# Patient Record
Sex: MALE | Race: OTHER | HISPANIC OR LATINO | URBAN - METROPOLITAN AREA
[De-identification: names, ages, dates, MRNs, and addresses within clinical notes are randomized per-mention and may not be internally consistent; named-entity substitution may affect disease eponyms.]

---

## 2024-09-14 ENCOUNTER — EMERGENCY (EMERGENCY)
Facility: HOSPITAL | Age: 24
LOS: 1 days | End: 2024-09-14
Attending: EMERGENCY MEDICINE
Payer: SELF-PAY

## 2024-09-14 VITALS
SYSTOLIC BLOOD PRESSURE: 122 MMHG | RESPIRATION RATE: 18 BRPM | DIASTOLIC BLOOD PRESSURE: 79 MMHG | TEMPERATURE: 99 F | HEART RATE: 76 BPM | WEIGHT: 153 LBS | OXYGEN SATURATION: 98 %

## 2024-09-14 VITALS
RESPIRATION RATE: 20 BRPM | TEMPERATURE: 98 F | DIASTOLIC BLOOD PRESSURE: 77 MMHG | HEART RATE: 70 BPM | OXYGEN SATURATION: 97 % | SYSTOLIC BLOOD PRESSURE: 120 MMHG

## 2024-09-14 PROCEDURE — 12011 RPR F/E/E/N/L/M 2.5 CM/<: CPT

## 2024-09-14 PROCEDURE — 73110 X-RAY EXAM OF WRIST: CPT

## 2024-09-14 PROCEDURE — T1013: CPT

## 2024-09-14 PROCEDURE — 90471 IMMUNIZATION ADMIN: CPT

## 2024-09-14 PROCEDURE — 72125 CT NECK SPINE W/O DYE: CPT | Mod: MC

## 2024-09-14 PROCEDURE — 99053 MED SERV 10PM-8AM 24 HR FAC: CPT

## 2024-09-14 PROCEDURE — 73110 X-RAY EXAM OF WRIST: CPT | Mod: 26,LT

## 2024-09-14 PROCEDURE — 70450 CT HEAD/BRAIN W/O DYE: CPT | Mod: MC

## 2024-09-14 PROCEDURE — 70450 CT HEAD/BRAIN W/O DYE: CPT | Mod: 26,MC

## 2024-09-14 PROCEDURE — 72125 CT NECK SPINE W/O DYE: CPT | Mod: 26,MC

## 2024-09-14 PROCEDURE — 90715 TDAP VACCINE 7 YRS/> IM: CPT

## 2024-09-14 PROCEDURE — 99284 EMERGENCY DEPT VISIT MOD MDM: CPT | Mod: 25

## 2024-09-14 RX ORDER — TETANUS TOXOID, REDUCED DIPHTHERIA TOXOID AND ACELLULAR PERTUSSIS VACCINE, ADSORBED 5; 2.5; 8; 8; 2.5 [IU]/.5ML; [IU]/.5ML; UG/.5ML; UG/.5ML; UG/.5ML
0.5 SUSPENSION INTRAMUSCULAR ONCE
Refills: 0 | Status: COMPLETED | OUTPATIENT
Start: 2024-09-14 | End: 2024-09-14

## 2024-09-14 RX ADMIN — TETANUS TOXOID, REDUCED DIPHTHERIA TOXOID AND ACELLULAR PERTUSSIS VACCINE, ADSORBED 0.5 MILLILITER(S): 5; 2.5; 8; 8; 2.5 SUSPENSION INTRAMUSCULAR at 06:38

## 2024-09-14 NOTE — ED PROVIDER NOTE - NSFOLLOWUPINSTRUCTIONS_ED_ALL_ED_FT
- Please bring all documentation from your ED visit to any related future follow up appointment.  - Please call to schedule follow up appointment with your primary care physician within 24-48 hours.  - Please seek immediate medical attention or return to the ED for any new/worsening, signs/symptoms, or concerns - Please bring all documentation from your ED visit to any related future follow up appointment.  - Please call to schedule follow up appointment with your primary care physician within 24-48 hours.  - Please seek immediate medical attention or return to the ED for any new/worsening, signs/symptoms, or concerns    return to ED in 1 week for suture removal  regrese en kaden semana para retirar la sutura: lave la herida diariamente con agua y jabón, regrese a la celestino de emergencias si presenta algún signo de infección.

## 2024-09-14 NOTE — ED PROVIDER NOTE - ATTENDING APP SHARED VISIT CONTRIBUTION OF CARE
I performed a history and physical exam of the patient and discussed their management with the advanced care provider. I reviewed the advanced care provider's note and agree with the documented findings and plan of care. My medical decision making and objective findings are found below.     Patient presenting with laceration to R eyebrow s/p fall down 13 steps, no loss of consciousness. Also noted to have tenderness over L wrist. XR neg for fracture. CT neg for ICH. Lac repaired by PA, stable for dc.

## 2024-09-14 NOTE — ED ADULT TRIAGE NOTE - PATIENT ON (OXYGEN DELIVERY METHOD)
Pt c/o left leg pain rated \"8/10\" onset 4 days ago. Hx PVD with a stent in pt's left leg. Pt also states right leg swelling with pain rated \"4/10\" onset \"years\". Pt denies respiratory difficulty. Dr. Galeano.   
room air

## 2024-09-14 NOTE — ED PROVIDER NOTE - PHYSICAL EXAMINATION
General: non-toxic appearing, in no acute distress, A+Ox3  HENT: no mastoid or facial ttp or eechymosis  CV: RRR, nl s1/s2.  Resp: CTAB, normal rate and effort  GI: Abdomen soft, NT,  Neuro: CN II-XII intact, sensorimotor intact without deficits   MSK: + ttp of L wrist. No spine tenderness to palpation, Full ROM and strength ext x 4. Normal Gait  Skin: small superficial 2cm lac under R eyebrow

## 2024-09-14 NOTE — ED PROVIDER NOTE - PATIENT PORTAL LINK FT
You can access the FollowMyHealth Patient Portal offered by [facility] by registering at the following website: [email or web address]. By joining [facility]’s FollowMyHealth portal, you will also be able to view your health information using other applications (apps) compatible with our system.

## 2024-09-14 NOTE — ED PROVIDER NOTE - CLINICAL SUMMARY MEDICAL DECISION MAKING FREE TEXT BOX
23yo M p/w lac and wrist pain s/p fall down 15 stairs. on eval pt appeared well, alert and oriented, 2cm superficial lac noted under R eyebrow, ttp of L wrist, remainder of PE WNL. CT neg for intracranial bleeding. pending wrist XR. reviewed results with pt. repaired lac with 2 absorbable sutures. advised to f/u with pcp. discussed supportive car remeasures and return precautions. pt verbalized understanding and agreement 25yo M p/w lac and wrist pain s/p fall down 15 stairs. on eval pt appeared well, alert and oriented, 2cm superficial lac noted under R eyebrow, ttp of L wrist, remainder of PE WNL. CT neg for intracranial bleeding. pending wrist XR. reviewed results with pt. repaired lac with 3 absorbable sutures. pt received tetanus. advised to f/u with pcp. discussed supportive car remeasures and return precautions. pt verbalized understanding and agreement

## 2024-09-14 NOTE — ED ADULT NURSE NOTE - NSFALLOOBATTEMPT_ED_ALL_ED
- Home Oxycodone (Confirmed via [employer])  - c/w Oxycodone IR 15mg q 6 prn   - c/w Diluadid 0.5mg IVP q 6 prn for breakthrough pain  - Savella not in formulary No

## 2024-09-14 NOTE — ED PROVIDER NOTE - OBJECTIVE STATEMENT
25yo M denies pmh presents to ED for laceration under R eyebrow s/p fall down stairs. pt reports tripped while going down 15 stairs in house. pt was able to ambulate immediately after incident. reports focal pain to area of laceration, otherwise no head pain. denies LOC, amnesia, lightheadedness/dizziness, neck/back pain, NV, abdominal pain, ETOH use

## 2024-09-14 NOTE — ED ADULT NURSE REASSESSMENT NOTE - NS ED NURSE REASSESS COMMENT FT1
Assumed care of patient at 0730. Family at bedside, recently returned from xray. No current pain. Breathing is spontaneous even and unlabored.  Safety precautions maintained.

## 2024-09-14 NOTE — ED ADULT TRIAGE NOTE - CHIEF COMPLAINT QUOTE
Pt stated that he fell down 16 steps about 30 minutes ago.  Pt noted with a laceration to right eyebrow, and bilateral arms, bleeding controlled. Denies LOC, and blood thinners. Denies ,medical hx.

## 2025-07-29 NOTE — ED ADULT NURSE NOTE - OBJECTIVE STATEMENT
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 It has gone well.  Started marching band, plays trombone  No side effects  Mom feels that he is doing well.  OARRS I have personally reviewed OARRS report and have considered the risks of abuse, dependence and addiction or diversion.    CSA signed - 7-  Urine drug screen if applicable - n/a    PHYSICAL EXAM:  Heart rate regular  Disposition: answers questions well, good eye contact    ASSESSMENT/PLAN:  ADHD   Continue methylphenidate ER 54 mg - 3 months of prescription sent  Follow up in November for well visit and medication follow up or sooner if concerns           
Patient states that he fell down stairs about 30 mins PTA. patient noted with laceration to right eyebrow. patient denies any LOC and denies taking blood thinners. patient denies changes in vision. patient complaining of left wrist pain.